# Patient Record
Sex: FEMALE | Race: OTHER | NOT HISPANIC OR LATINO | URBAN - METROPOLITAN AREA
[De-identification: names, ages, dates, MRNs, and addresses within clinical notes are randomized per-mention and may not be internally consistent; named-entity substitution may affect disease eponyms.]

---

## 2017-07-22 ENCOUNTER — EMERGENCY (EMERGENCY)
Facility: HOSPITAL | Age: 52
LOS: 1 days | Discharge: HOME | End: 2017-07-22
Attending: EMERGENCY MEDICINE | Admitting: EMERGENCY MEDICINE
Payer: COMMERCIAL

## 2017-07-22 VITALS
TEMPERATURE: 98 F | OXYGEN SATURATION: 99 % | HEART RATE: 66 BPM | SYSTOLIC BLOOD PRESSURE: 127 MMHG | DIASTOLIC BLOOD PRESSURE: 77 MMHG | RESPIRATION RATE: 16 BRPM

## 2017-07-22 VITALS
TEMPERATURE: 98 F | HEART RATE: 68 BPM | OXYGEN SATURATION: 100 % | SYSTOLIC BLOOD PRESSURE: 122 MMHG | RESPIRATION RATE: 20 BRPM | DIASTOLIC BLOOD PRESSURE: 68 MMHG

## 2017-07-22 PROCEDURE — 99284 EMERGENCY DEPT VISIT MOD MDM: CPT

## 2017-07-22 PROCEDURE — 99283 EMERGENCY DEPT VISIT LOW MDM: CPT

## 2017-07-22 RX ORDER — DIAZEPAM 5 MG
1 TABLET ORAL
Qty: 5 | Refills: 0 | OUTPATIENT
Start: 2017-07-22 | End: 2017-07-27

## 2017-07-22 RX ORDER — OXYCODONE HYDROCHLORIDE 5 MG/1
1 TABLET ORAL
Qty: 12 | Refills: 0 | OUTPATIENT
Start: 2017-07-22 | End: 2017-07-26

## 2017-07-22 RX ORDER — DIAZEPAM 5 MG
5 TABLET ORAL ONCE
Qty: 0 | Refills: 0 | Status: DISCONTINUED | OUTPATIENT
Start: 2017-07-22 | End: 2017-07-22

## 2017-07-22 RX ORDER — OXYCODONE HYDROCHLORIDE 5 MG/1
5 TABLET ORAL ONCE
Qty: 0 | Refills: 0 | Status: DISCONTINUED | OUTPATIENT
Start: 2017-07-22 | End: 2017-07-22

## 2017-07-22 RX ORDER — ACETAMINOPHEN 500 MG
975 TABLET ORAL ONCE
Qty: 0 | Refills: 0 | Status: COMPLETED | OUTPATIENT
Start: 2017-07-22 | End: 2017-07-22

## 2017-07-22 RX ADMIN — OXYCODONE HYDROCHLORIDE 5 MILLIGRAM(S): 5 TABLET ORAL at 10:18

## 2017-07-22 RX ADMIN — Medication 975 MILLIGRAM(S): at 09:20

## 2017-07-22 RX ADMIN — Medication 40 MILLIGRAM(S): at 09:20

## 2017-07-22 RX ADMIN — Medication 975 MILLIGRAM(S): at 10:07

## 2017-07-22 RX ADMIN — Medication 5 MILLIGRAM(S): at 09:20

## 2017-07-22 RX ADMIN — OXYCODONE HYDROCHLORIDE 5 MILLIGRAM(S): 5 TABLET ORAL at 11:03

## 2017-07-22 NOTE — ED ADULT NURSE NOTE - OBJECTIVE STATEMENT
51 y/o female c/o left lower back pain x 4 days with numbness radiates down LLE.  Pt states this started after lifting luggage while on vacation in [place]. No motor weakness.  No bowel/bladder dysfunction.  No trauma.   Pain worse with bending.  Left leg pain with moving/ bending leg.  Sensation intact.  Respiration easy and non labored.

## 2017-07-22 NOTE — ED PROVIDER NOTE - PROGRESS NOTE DETAILS
Patient's pain and paresthesias improved, well appearing, comfortable c d/c plan.  Will Rx pain Rx and steroids.  --BMM

## 2017-07-22 NOTE — ED PROVIDER NOTE - CARE PLAN
Principal Discharge DX:	Sciatica of left side Principal Discharge DX:	Sciatica of left side  Instructions for follow-up, activity and diet:	Follow up with the [facility] [phone] on Monday  Motrin 600 mg every 8 hours, alternate with Tylenol 500 mg as needed for pain  Valium 5 mg (one pill) at night for discomfort  Prednisone 40 mg (one pill) daily for 5 days  Return for bowel/bladder changes or problems, severe pain, fever, or inability to walk Principal Discharge DX:	Sciatica of left side  Instructions for follow-up, activity and diet:	Follow up with the [facility] [phone] on Monday  Motrin 600 mg every 8 hours, alternate with Tylenol 500 mg as needed for pain  Valium 5 mg (one pill) at night for discomfort  Prednisone 40 mg (one pill) daily for 5 days  Return for bowel/bladder changes or problems, severe pain, fever, or inability to walk

## 2017-07-22 NOTE — ED PROVIDER NOTE - MEDICAL DECISION MAKING DETAILS
52 y F c L sciatica provoked by lifting.  Atraumatic.  no "red flag" symptoms (paresthesias, fever, CA hx, etc).  Already took motrin.  Tylenol, valium, prednisone, encourage heat packs, d/c c spine f/u.  Does not require neuro imaging at this time.  Strict return precautions discussed c pt.  --BMM

## 2017-07-22 NOTE — ED PROVIDER NOTE - OBJECTIVE STATEMENT
52 y F known DDD/sciatica (prior on R) c 4 days L LBP c new paresthesias down LLE.  No motor weakness.  No bowel/bladder dysfunction.  No trauma.  No h/o CA.  No weight loss.  Occurred after lifting luggage while on vacation in [place].  Had pain that worsened the next day.  Was on long uncomfortable plane flight afterwards.  Pain has been progressively worsening but typically able to control symptoms c motrin.  W/U today c new paresthesias in LLE, primarily on lateral aspect and at bottom of foot.  Worse c bending.

## 2017-07-22 NOTE — ED PROVIDER NOTE - PHYSICAL EXAMINATION
GENERAL: AAOx4, GCS 15, NAD, WDWN; HEENT: MMM, no jugular venous distension, supple neck, PERRLA, EOMI, nonicteric sclera; PULM: CTA B, no crackles/rubs/rales; CV: RRR, S1S2, no MRG; ABD: Flat abdomen, NTND, no R/G/R, no CVAT.  MSK: No midline TTP.  SLR on L reproduced L LBP pain but no radicular symptoms.  Antalgic gait.  Subjective paresthesias in Lateral calf and plantar aspect of foot.  Downgoing plantars B.  +2 patellar reflexes B.  4+/5 hip flex and knee flex, 5/5 strength all other groups LLE.  ANDINO, +2 pulses x4;  NEURO: See MSK.   PSYCH: AAOx3, clear thought and normal sensorium.

## 2017-07-22 NOTE — ED PROVIDER NOTE - PLAN OF CARE
Follow up with the [facility] [phone] on Monday  Motrin 600 mg every 8 hours, alternate with Tylenol 500 mg as needed for pain  Valium 5 mg (one pill) at night for discomfort  Prednisone 40 mg (one pill) daily for 5 days  Return for bowel/bladder changes or problems, severe pain, fever, or inability to walk

## 2017-11-13 RX ORDER — LEVOTHYROXINE SODIUM 125 MCG
0 TABLET ORAL
Qty: 0 | Refills: 0 | COMMUNITY

## 2022-06-13 ENCOUNTER — EMERGENCY (EMERGENCY)
Facility: HOSPITAL | Age: 57
LOS: 1 days | Discharge: HOME | End: 2022-06-13
Admitting: EMERGENCY MEDICINE
Payer: COMMERCIAL

## 2022-06-13 VITALS
OXYGEN SATURATION: 100 % | SYSTOLIC BLOOD PRESSURE: 118 MMHG | HEART RATE: 72 BPM | TEMPERATURE: 98 F | RESPIRATION RATE: 16 BRPM | DIASTOLIC BLOOD PRESSURE: 59 MMHG

## 2022-06-13 PROCEDURE — 73630 X-RAY EXAM OF FOOT: CPT | Mod: 26,RT

## 2022-06-13 PROCEDURE — 99284 EMERGENCY DEPT VISIT MOD MDM: CPT | Mod: 25

## 2022-06-13 PROCEDURE — 12001 RPR S/N/AX/GEN/TRNK 2.5CM/<: CPT

## 2022-06-13 RX ORDER — OXYCODONE AND ACETAMINOPHEN 5; 325 MG/1; MG/1
1 TABLET ORAL ONCE
Refills: 0 | Status: DISCONTINUED | OUTPATIENT
Start: 2022-06-13 | End: 2022-06-13

## 2022-06-13 RX ORDER — IBUPROFEN 200 MG
600 TABLET ORAL ONCE
Refills: 0 | Status: COMPLETED | OUTPATIENT
Start: 2022-06-13 | End: 2022-06-13

## 2022-06-13 RX ORDER — KETOROLAC TROMETHAMINE 30 MG/ML
30 SYRINGE (ML) INJECTION ONCE
Refills: 0 | Status: DISCONTINUED | OUTPATIENT
Start: 2022-06-13 | End: 2022-06-13

## 2022-06-13 RX ORDER — IBUPROFEN 200 MG
1 TABLET ORAL
Qty: 25 | Refills: 0
Start: 2022-06-13

## 2022-06-13 RX ADMIN — Medication 30 MILLIGRAM(S): at 20:10

## 2022-06-13 RX ADMIN — OXYCODONE AND ACETAMINOPHEN 1 TABLET(S): 5; 325 TABLET ORAL at 20:10

## 2022-06-13 RX ADMIN — Medication 600 MILLIGRAM(S): at 22:23

## 2022-06-13 RX ADMIN — OXYCODONE AND ACETAMINOPHEN 1 TABLET(S): 5; 325 TABLET ORAL at 22:23

## 2022-06-13 NOTE — ED PROVIDER NOTE - NSPTACCESSSVCSAPPTDETAILS_ED_ALL_ED_FT
Please evaluate for right great toe fracture fracture w/ nailbed laceration s/p heavy object falling on toe

## 2022-06-13 NOTE — ED ADULT TRIAGE NOTE - CHIEF COMPLAINT QUOTE
pt had a weight fall on the right big toe, toe actively bleeding,  wrapped in gauze at this time to control bleeding. denies any other complaints. no dizziness, lightheadedness. no PMH

## 2022-06-13 NOTE — ED PROVIDER NOTE - NSFOLLOWUPINSTRUCTIONS_ED_ALL_ED_FT
Please follow up with foot specialist for continued evaluation.   Fracture    A fracture is a break in one of your bones. This can occur from a variety of injuries, especially traumatic ones. Symptoms include pain, bruising, or swelling. Do not use the injured limb. If a fracture is in one of the bones below your waist, do not put weight on that limb unless instructed to do so by your healthcare provider. Crutches or a cane may have been provided. A splint or cast may have been applied by your health care provider. Make sure to keep it dry and follow up with an orthopedist as instructed.    SEEK IMMEDIATE MEDICAL CARE IF YOU HAVE ANY OF THE FOLLOWING SYMPTOMS: numbness, tingling, increasing pain, or weakness in any part of the injured limb.

## 2022-06-13 NOTE — ED PROVIDER NOTE - CLINICAL SUMMARY MEDICAL DECISION MAKING FREE TEXT BOX
Pt is a 56 y/o female, a , w/ PMH significant for hypothryoid presenting to ED with complaint of toe injury s/p trauma that occurred 4 hours ago. Will provide wound care, antibiotics, and pain management. R/o fracture w/ X-ray of foot. Advised Pt on importance of non-weight bearing and will provide crutches/cane. Likely discharge, Pt will follow-up w/ podiatrist for further management/care.

## 2022-06-13 NOTE — ED PROVIDER NOTE - CARE PROVIDER_API CALL
Waterhouse, Joseph C (DPM)  Surgery  [address] [place] [address]  Phone: [phone]  Fax: [phone]  Follow Up Time: 1-3 Days

## 2022-06-13 NOTE — ED PROVIDER NOTE - ADDITIONAL NOTES AND INSTRUCTIONS:
Please excuse the absence of Ms. Patel. She was evaluated in the ED for a right foot injury. She may return to work on 6/20/22.

## 2022-06-13 NOTE — ED PROVIDER NOTE - MUSCULOSKELETAL, MLM
right foot: tenderness and swelling of great toe.  proximal nail avulsed from nail fold, w/i laceration to nail bed. wound actively bleeding.

## 2022-06-13 NOTE — ED PROVIDER NOTE - PATIENT PORTAL LINK FT
You can access the FollowMyHealth Patient Portal offered by [facility] by registering at the following website: [email or web address]. By joining [facility]’s FollowMyHealth portal, you will also be able to view your health information using other applications (apps) compatible with our system.

## 2022-06-13 NOTE — ED PROVIDER NOTE - CARE PLAN
1 Principal Discharge DX:	Nondisplaced fracture of distal phalanx of right great toe  Secondary Diagnosis:	Laceration of nail bed of toe, initial encounter

## 2022-06-13 NOTE — ED PROVIDER NOTE - OBJECTIVE STATEMENT
Pt is a 58 y/o female, a , w/ PMH significant for hypothryoid presenting to ED with complaint of toe injury s/p trauma that occurred 4 hours ago. Per patient cousin, a historian, Pt was working out when Pt dropped 25 lb weight on her right great toe, felt normal initially, but noticed numbing sensation and bleeding socks and shoes. Took off the socks to find her right great toe bleeding profusely. Toe injury was throbbing and intermittent pain. Alleviated by elevation and Advil. Pt denies putting pressure on the wound prior to arrival.

## 2022-06-14 PROBLEM — E03.9 HYPOTHYROIDISM, UNSPECIFIED: Chronic | Status: ACTIVE | Noted: 2017-07-22

## 2023-01-31 NOTE — ED PROVIDER NOTE - DISPOSITION TYPE
DISCHARGE
For information on Fall & Injury Prevention, visit: [email or web address] OR  [email or web address] OR  https://www.cdc.gov/steadi/patient.html

## 2024-09-18 ENCOUNTER — EMERGENCY (EMERGENCY)
Facility: HOSPITAL | Age: 59
LOS: 1 days | Discharge: HOME | End: 2024-09-18
Attending: EMERGENCY MEDICINE
Payer: COMMERCIAL

## 2024-09-18 VITALS
TEMPERATURE: 98 F | SYSTOLIC BLOOD PRESSURE: 156 MMHG | HEART RATE: 85 BPM | OXYGEN SATURATION: 100 % | WEIGHT: 147.05 LBS | RESPIRATION RATE: 20 BRPM | DIASTOLIC BLOOD PRESSURE: 84 MMHG | HEIGHT: 63 IN

## 2024-09-18 PROCEDURE — 99284 EMERGENCY DEPT VISIT MOD MDM: CPT

## 2024-09-18 PROCEDURE — 96372 THER/PROPH/DIAG INJ SC/IM: CPT

## 2024-09-18 PROCEDURE — 99283 EMERGENCY DEPT VISIT LOW MDM: CPT | Mod: 25

## 2024-09-18 RX ORDER — KETOROLAC TROMETHAMINE 30 MG/ML
30 INJECTION, SOLUTION INTRAMUSCULAR ONCE
Refills: 0 | Status: DISCONTINUED | OUTPATIENT
Start: 2024-09-18 | End: 2024-09-18

## 2024-09-18 RX ORDER — ACETAMINOPHEN 325 MG/1
975 TABLET ORAL ONCE
Refills: 0 | Status: COMPLETED | OUTPATIENT
Start: 2024-09-18 | End: 2024-09-18

## 2024-09-18 RX ORDER — CYCLOBENZAPRINE HCL 10 MG
1 TABLET ORAL
Qty: 5 | Refills: 0
Start: 2024-09-18 | End: 2024-09-22

## 2024-09-18 RX ORDER — LIDOCAINE/BENZALKONIUM/ALCOHOL
1 SOLUTION, NON-ORAL TOPICAL ONCE
Refills: 0 | Status: COMPLETED | OUTPATIENT
Start: 2024-09-18 | End: 2024-09-18

## 2024-09-18 RX ORDER — CYCLOBENZAPRINE HCL 10 MG
10 TABLET ORAL ONCE
Refills: 0 | Status: COMPLETED | OUTPATIENT
Start: 2024-09-18 | End: 2024-09-18

## 2024-09-18 RX ADMIN — Medication 1 PATCH: at 07:53

## 2024-09-18 RX ADMIN — KETOROLAC TROMETHAMINE 30 MILLIGRAM(S): 30 INJECTION, SOLUTION INTRAMUSCULAR at 07:53

## 2024-09-18 RX ADMIN — ACETAMINOPHEN 975 MILLIGRAM(S): 325 TABLET ORAL at 07:53

## 2024-09-18 RX ADMIN — Medication 10 MILLIGRAM(S): at 07:53

## 2024-09-18 NOTE — ED PROVIDER NOTE - OBJECTIVE STATEMENT
59-year-old female with a history of sciatica and hypothyroidism presenting to the emergency department for the evaluation of low back pain with radiation into the left buttock and leg.  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  Noted yesterday the pain was getting worse and feels like her prior sciatica exacerbations.  Patient took Advil and oxycodone last night without much relief.  Patient denies any saddle anesthesia, numbness of the extremities, weakness or incontinence.  Patient had an epidural injection however it was many years ago and she is unsure of her pain management physician is even practicing anymore.  No fever or chills.  Patient is otherwise in her usual state of health.

## 2024-09-18 NOTE — ED ADULT NURSE NOTE - NSFALLUNIVINTERV_ED_ALL_ED
Bed/Stretcher in lowest position, wheels locked, appropriate side rails in place/Call bell, personal items and telephone in reach/Instruct patient to call for assistance before getting out of bed/chair/stretcher/Non-slip footwear applied when patient is off stretcher/[place] to call system/Physically safe environment - no spills, clutter or unnecessary equipment/Purposeful proactive rounding/Room/bathroom lighting operational, light cord in reach

## 2024-09-18 NOTE — ED PROVIDER NOTE - CLINICAL SUMMARY MEDICAL DECISION MAKING FREE TEXT BOX
Attending MD Fonseca: 59-year-old woman presenting for evaluation of "sciatica".  Pain is in the left low back radiates down the left leg.  Associated numbness and tingling of the leg.  Denies any saddle anesthesia, no fevers or chills.  Reports similar history of pain in the past that has received epidural steroid injections with improvement.  No surgical history of the spine in the past.  Took Advil last night with little improvement in pain as well as a muscle relaxant which she states was oxycodone.    Patient's vital signs are nonactionable.  The patient is lying in the stretcher somewhat uncomfortable but nontoxic.  DP pulses palpable bilateral feet extremities warm and well-perfused.  No midline spinal tenderness to palpation.  Mild tenderness of left paraspinal lumbar musculature.  5/5 strength in bilateral lower extremities, sensation grossly intact to light touch throughout bilateral lower extremities 2+ patellar and Achilles reflexes bilaterally.     Clinical history is consistent with lumbar radiculopathy.  No signs or symptoms of cauda equina syndrome or serious spinal pathology at this time.  Plan at this time will be supportive care with NSAIDs Tylenol muscle relaxants and referral to spine center for further management which may include physical therapy or [id number]          *The above represents an initial assessment/impression. Please refer to progress notes for potential changes in patient clinical course*

## 2024-09-18 NOTE — ED PROVIDER NOTE - CARE PROVIDERS DIRECT ADDRESSES
,[email or web address]

## 2024-09-18 NOTE — ED PROVIDER NOTE - ATTENDING APP SHARED VISIT CONTRIBUTION OF CARE
Attending MD Fonseca: I personally made/approved the management plan and take responsibility for the patient management.

## 2024-09-18 NOTE — ED ADULT NURSE NOTE - OBJECTIVE STATEMENT
Patient is a 59 year old female complaining of back pain. Patient reports back pain is located near her tailbone radiates down her left leg  started yesterday and progressively has gotten worse overnight - took a muscle relaxer at home (unknown name) with no relief. On assessment patient is A&Ox4, PERRL, sensory grossly intact, able to move all extremities well, bilateral equal hand grasp, clear speech, breathing comfortably on room air, no accessory muscle use, no cough, chest rise and fall equal, no jvd, no edema, abdomen soft nontender, skin warm and normal for race. Patient denies headache, dizziness, chest pain, palpitations, cough, SOB, abdominal pain, n/v/d, urinary symptoms, fevers, chills, weakness at this time. PMH sciatica.

## 2024-09-18 NOTE — ED PROVIDER NOTE - PROGRESS NOTE DETAILS
Feeling improved discussed management at home, follow-up.  Went over analgesia regimen and recommended remaining out of work for the next several days. - Elisabeth Narayanan PA-C

## 2024-09-18 NOTE — ED PROVIDER NOTE - NSFOLLOWUPINSTRUCTIONS_ED_ALL_ED_FT
Take Motrin 400-600mg every 6 hrs as needed for pain. Take with food   Take Tylenol 650mg every 6 hrs as needed for pain.  Take Flexeril 10 mg at night  use lidoderm patches  Follow up with Spine Take Motrin 400 every 6 hrs as needed for pain. Take with food   Take Tylenol 650mg every 6 hrs as needed for pain.  Take Flexeril 10 mg at night  use lidoderm patches  Follow up with Spine    Back Pain    Back pain is very common in adults. The cause of back pain is rarely dangerous and the pain often gets better over time. The cause of your back pain may not be known and may include strain of muscles or ligaments, degeneration of the spinal disks, or arthritis. Occasionally the pain may radiate down your leg(s). Over-the-counter medicines to reduce pain and inflammation are often the most helpful. Stretching and remaining active frequently helps the healing process.     SEEK IMMEDIATE MEDICAL CARE IF YOU HAVE ANY OF THE FOLLOWING SYMPTOMS: bowel or bladder control problems, unusual weakness or numbness in your arms or legs, nausea or vomiting, abdominal pain, fever, dizziness/lightheadedness.

## 2024-09-18 NOTE — ED PROVIDER NOTE - PATIENT PORTAL LINK FT
You can access the FollowMyHealth Patient Portal offered by [facility] by registering at the following website: [email or web address]. By joining [facility]’s FollowMyHealth portal, you will also be able to view your health information using other applications (apps) compatible with our system.

## 2024-09-18 NOTE — ED PROVIDER NOTE - CARE PROVIDER_API CALL
Sorin Story)  Orthopaedic Surgery  [address], [address]  [place] [address]  Phone: [phone]  Fax: [phone]  Follow Up Time: